# Patient Record
Sex: MALE | Race: WHITE | NOT HISPANIC OR LATINO
[De-identification: names, ages, dates, MRNs, and addresses within clinical notes are randomized per-mention and may not be internally consistent; named-entity substitution may affect disease eponyms.]

---

## 2022-07-18 ENCOUNTER — TRANSCRIPTION ENCOUNTER (OUTPATIENT)
Age: 87
End: 2022-07-18

## 2022-07-31 ENCOUNTER — TRANSCRIPTION ENCOUNTER (OUTPATIENT)
Age: 87
End: 2022-07-31

## 2023-06-16 ENCOUNTER — TRANSCRIPTION ENCOUNTER (OUTPATIENT)
Age: 88
End: 2023-06-16

## 2023-07-31 ENCOUNTER — APPOINTMENT (OUTPATIENT)
Dept: GERIATRICS | Facility: CLINIC | Age: 88
End: 2023-07-31
Payer: MEDICARE

## 2023-07-31 PROCEDURE — 99215 OFFICE O/P EST HI 40 MIN: CPT

## 2023-07-31 RX ORDER — GABAPENTIN 100 MG/1
100 CAPSULE ORAL
Qty: 90 | Refills: 3 | Status: ACTIVE | COMMUNITY
Start: 2023-07-31 | End: 1900-01-01

## 2023-09-30 VITALS
RESPIRATION RATE: 14 BRPM | SYSTOLIC BLOOD PRESSURE: 140 MMHG | OXYGEN SATURATION: 100 % | TEMPERATURE: 98 F | HEART RATE: 76 BPM | DIASTOLIC BLOOD PRESSURE: 76 MMHG | WEIGHT: 155.31 LBS

## 2023-09-30 RX ORDER — ACETAMINOPHEN 500 MG
500 TABLET ORAL EVERY 8 HOURS
Qty: 90 | Refills: 0 | Status: ACTIVE | COMMUNITY

## 2023-11-02 ENCOUNTER — APPOINTMENT (OUTPATIENT)
Dept: GERIATRICS | Facility: CLINIC | Age: 88
End: 2023-11-02
Payer: MEDICARE

## 2023-11-02 VITALS
HEART RATE: 86 BPM | SYSTOLIC BLOOD PRESSURE: 173 MMHG | DIASTOLIC BLOOD PRESSURE: 77 MMHG | TEMPERATURE: 97.3 F | RESPIRATION RATE: 16 BRPM | OXYGEN SATURATION: 98 % | WEIGHT: 156 LBS

## 2023-11-02 VITALS — HEIGHT: 64 IN

## 2023-11-02 VITALS — DIASTOLIC BLOOD PRESSURE: 78 MMHG | SYSTOLIC BLOOD PRESSURE: 138 MMHG

## 2023-11-02 PROCEDURE — 99215 OFFICE O/P EST HI 40 MIN: CPT

## 2023-12-07 ENCOUNTER — RESULT REVIEW (OUTPATIENT)
Age: 88
End: 2023-12-07

## 2023-12-07 ENCOUNTER — APPOINTMENT (OUTPATIENT)
Dept: GERIATRICS | Facility: CLINIC | Age: 88
End: 2023-12-07
Payer: MEDICARE

## 2023-12-07 VITALS
RESPIRATION RATE: 16 BRPM | TEMPERATURE: 97.6 F | DIASTOLIC BLOOD PRESSURE: 79 MMHG | OXYGEN SATURATION: 100 % | SYSTOLIC BLOOD PRESSURE: 130 MMHG | HEART RATE: 70 BPM

## 2023-12-07 DIAGNOSIS — R82.90 UNSPECIFIED ABNORMAL FINDINGS IN URINE: ICD-10-CM

## 2023-12-07 PROCEDURE — 99215 OFFICE O/P EST HI 40 MIN: CPT

## 2023-12-07 RX ORDER — ATORVASTATIN CALCIUM 80 MG/1
80 TABLET, FILM COATED ORAL
Qty: 30 | Refills: 0 | Status: DISCONTINUED | COMMUNITY
End: 2023-12-07

## 2023-12-07 RX ORDER — AMANTADINE HYDROCHLORIDE 100 MG/1
100 CAPSULE ORAL
Refills: 0 | Status: DISCONTINUED | COMMUNITY
End: 2023-12-07

## 2023-12-07 RX ORDER — GLUCOSA SU 2KCL/CHONDROITIN SU 500-400 MG
500 CAPSULE ORAL
Refills: 0 | Status: DISCONTINUED | COMMUNITY
End: 2023-12-07

## 2023-12-07 RX ORDER — GABAPENTIN 300 MG/1
300 CAPSULE ORAL
Qty: 90 | Refills: 3 | Status: ACTIVE | COMMUNITY
Start: 2023-12-07 | End: 1900-01-01

## 2023-12-12 DIAGNOSIS — N39.0 URINARY TRACT INFECTION, SITE NOT SPECIFIED: ICD-10-CM

## 2023-12-12 RX ORDER — CIPROFLOXACIN HYDROCHLORIDE 500 MG/1
500 TABLET, FILM COATED ORAL DAILY
Qty: 7 | Refills: 0 | Status: ACTIVE | COMMUNITY
Start: 2023-12-12 | End: 1900-01-01

## 2024-02-13 ENCOUNTER — NON-APPOINTMENT (OUTPATIENT)
Age: 89
End: 2024-02-13

## 2024-02-15 ENCOUNTER — TRANSCRIPTION ENCOUNTER (OUTPATIENT)
Age: 89
End: 2024-02-15

## 2024-02-29 ENCOUNTER — APPOINTMENT (OUTPATIENT)
Dept: GERIATRICS | Facility: CLINIC | Age: 89
End: 2024-02-29
Payer: MEDICARE

## 2024-02-29 VITALS — SYSTOLIC BLOOD PRESSURE: 130 MMHG | DIASTOLIC BLOOD PRESSURE: 82 MMHG

## 2024-02-29 VITALS
HEART RATE: 80 BPM | OXYGEN SATURATION: 97 % | TEMPERATURE: 99.1 F | BODY MASS INDEX: 25.95 KG/M2 | WEIGHT: 152 LBS | HEIGHT: 64 IN

## 2024-02-29 DIAGNOSIS — Z00.00 ENCOUNTER FOR GENERAL ADULT MEDICAL EXAMINATION W/OUT ABNORMAL FINDINGS: ICD-10-CM

## 2024-02-29 DIAGNOSIS — B37.2 CANDIDIASIS OF SKIN AND NAIL: ICD-10-CM

## 2024-02-29 PROCEDURE — 99215 OFFICE O/P EST HI 40 MIN: CPT

## 2024-02-29 RX ORDER — GABAPENTIN 100 MG/1
100 CAPSULE ORAL AT BEDTIME
Qty: 60 | Refills: 0 | Status: ACTIVE | COMMUNITY
Start: 2024-02-29 | End: 1900-01-01

## 2024-02-29 RX ORDER — QUETIAPINE FUMARATE 25 MG/1
25 TABLET ORAL
Qty: 60 | Refills: 3 | Status: ACTIVE | COMMUNITY
Start: 2024-02-29 | End: 1900-01-01

## 2024-02-29 NOTE — DATA REVIEWED
[FreeTextEntry1] : L3-L4: Disc bulge, bilateral facet arthropathy, ligamentous hypertrophy contributing to mild to moderate canal stenosis and mild to moderate bilateral foraminal stenosis.   L4-L5: Disc bulge, bilateral facet arthropathy, ligamentous hypertrophy contributing to no significant canal stenosis and mild bilateral foraminal stenosis.   L5-S1: Disc bulge, bilateral facet arthropathy, ligamentous hypertrophy contributing to no significant canal stenosis and mild bilateral foraminal stenosis.    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  Nondisplaced fracture of the left L2 transverse process.

## 2024-03-23 PROBLEM — B37.2 CANDIDAL INTERTRIGO: Status: ACTIVE | Noted: 2023-12-07

## 2024-03-23 NOTE — HEALTH RISK ASSESSMENT
[One fall no injury in past year] : Patient reported one fall in the past year without injury [Intercurrent hospitalizations] : was admitted to the hospital  [I will adhere to the patient's wishes.] : I will adhere to the patient's wishes. [FreeTextEntry4] : JUANSI reivewed DNr/DNi and trial of IV abx or IV hyydration

## 2024-03-23 NOTE — CURRENT MEDS
[Side Effects] :  side effects [Yes] : Reviewed medication list for presence of high-risk medications. [Lack of understanding] : lack of understanding [Sedatives] : sedatives

## 2024-03-23 NOTE — ASSESSMENT
[Living arrangements] : living arrangements [Pain Management] : pain management [Medication Management] : medication management [FreeTextEntry1] : pt is 89 y/o M with moderate dementia, FAST 6 with recent discharge from hospital for acute near syncopal episode s/p fall with non displaced fracture of L2 transverse process recetn hospitalization for acute UTI, concerned for behavioral with sedation during day, discussed pt likely to be in pain   gabapentin increased to 200 mg QHS may need to titrate to 300 mg , do not take during day continue tylenol 1000 mg TID MDD: 3000 mg   here for f/u of neuropathic pain as per daughter well controlled during day worse at night decrease gabapentin 200 mg QHS --discussed can titrate TID as needed for pain as for daytime continue tylenol 1000 mG TID decrease seroquel to 25 mg BID, if too sedated will decrease as tolerated  aspiration and fall precautions discussed ACD, MOLST reviewed DNR pt daughter antionetta is HCP continue medications as prescribed extensive discussion held with pt daughter and EUGENE regardin routine, reorienting pt, nonverbal signs of pain, appropriate use of medications, fall precautions, and adverse effects of sedation  constipation prune juice daily senna tabs 1 tab QHS  f/u in 1 month  PC SW referral for caregiver support discussed dementia and with progression of disease and trajectory pt daughter also with pt wife also with dementia in the home she cares for. medication management.

## 2024-03-23 NOTE — CURRENT MEDS
[Side Effects] :  side effects [Lack of understanding] : lack of understanding [Yes] : Reviewed medication list for presence of high-risk medications. [Sedatives] : sedatives

## 2024-03-23 NOTE — PHYSICAL EXAM
[General Appearance - Alert] : alert [General Appearance - In No Acute Distress] : in no acute distress [General Appearance - Well Nourished] : well nourished [Sclera] : the sclera and conjunctiva were normal [Normal Oral Mucosa] : normal oral mucosa [No Oral Pallor] : no oral pallor [No Oral Cyanosis] : no oral cyanosis [Outer Ear] : the ears and nose were normal in appearance [Hearing Threshold Finger Rub Not [place]] : hearing was normal [Examination Of The Oral Cavity] : the lips and gums were normal [Oropharynx] : The oropharynx was normal [Nasal Cavity] : the nasal mucosa and septum were normal [Neck Appearance] : the appearance of the neck was normal [Exaggerated Use Of Accessory Muscles For Inspiration] : no accessory muscle use [Auscultation Breath Sounds / Voice Sounds] : lungs were clear to auscultation bilaterally [Heart Rate And Rhythm] : heart rate was normal and rhythm regular [Heart Sounds] : normal S1 and S2 [Full Pulse] : the pedal pulses are present [Edema] : there was no peripheral edema [Bowel Sounds] : normal bowel sounds [Abdomen Soft] : soft [Abdomen Tenderness] : non-tender [] : no hepato-splenomegaly [Normal Sclera/Conjunctiva] : normal sclera/conjunctiva [No Acute Distress] : no acute distress [Normal Outer Ear/Nose] : the outer ears and nose were normal in appearance [Normal Oropharynx] : the oropharynx was normal [Supple] : supple [No JVD] : no jugular venous distention [No Accessory Muscle Use] : no accessory muscle use [No Respiratory Distress] : no respiratory distress  [Normal Rate] : normal rate  [Normal S1, S2] : normal S1 and S2 [Regular Rhythm] : with a regular rhythm [Soft] : abdomen soft [No HSM] : no HSM [Normal] : no rash

## 2024-03-23 NOTE — PHYSICAL EXAM
[General Appearance - Alert] : alert [General Appearance - In No Acute Distress] : in no acute distress [General Appearance - Well Nourished] : well nourished [Sclera] : the sclera and conjunctiva were normal [Normal Oral Mucosa] : normal oral mucosa [No Oral Pallor] : no oral pallor [No Oral Cyanosis] : no oral cyanosis [Outer Ear] : the ears and nose were normal in appearance [Hearing Threshold Finger Rub Not [place]] : hearing was normal [Examination Of The Oral Cavity] : the lips and gums were normal [Nasal Cavity] : the nasal mucosa and septum were normal [Oropharynx] : The oropharynx was normal [Neck Appearance] : the appearance of the neck was normal [Exaggerated Use Of Accessory Muscles For Inspiration] : no accessory muscle use [Auscultation Breath Sounds / Voice Sounds] : lungs were clear to auscultation bilaterally [Heart Rate And Rhythm] : heart rate was normal and rhythm regular [Heart Sounds] : normal S1 and S2 [Full Pulse] : the pedal pulses are present [Edema] : there was no peripheral edema [Bowel Sounds] : normal bowel sounds [Abdomen Soft] : soft [Abdomen Tenderness] : non-tender [] : no hepato-splenomegaly [Normal Sclera/Conjunctiva] : normal sclera/conjunctiva [No Acute Distress] : no acute distress [Normal Outer Ear/Nose] : the outer ears and nose were normal in appearance [Normal Oropharynx] : the oropharynx was normal [No JVD] : no jugular venous distention [Supple] : supple [No Respiratory Distress] : no respiratory distress  [No Accessory Muscle Use] : no accessory muscle use [Normal Rate] : normal rate  [Normal S1, S2] : normal S1 and S2 [Regular Rhythm] : with a regular rhythm [Soft] : abdomen soft [No HSM] : no HSM [Normal] : no rash

## 2024-03-23 NOTE — COUNSELING
[Fall prevention counseling provided] : Fall prevention counseling provided [No throw rugs] : No throw rugs [Adequate lighting] : Adequate lighting [Use recommended devices] : Use recommended devices [Use proper foot wear] : Use proper foot wear

## 2024-03-23 NOTE — HISTORY OF PRESENT ILLNESS
[FreeTextEntry1] : pt is 89 y/o M with moderate dementia, FAST 6 with recent discharge from hospital for acute near syncopal episode s/p fall with non displaced fracture of L2 transverse process complicated by acute bronchitis with history of chronic pain.  pt was seen in hospital, referred by Dr. Monreal, pt ryan Benoit is main caregiver, has HHA assistance pt reports pain in back, unable to give further details due to cogntiive impairment, daughter reports pt still gets up at night however has been sleeping better since d/c from hospital and pain appears to be well controlled. + bm  appetite is adequate. No falls since d/c, overall stable, no concerns at this time pain in knees, lower back, + constipation  OFFICE visit 11/2/23 pt seen with his daughter  reports pt with increased pain and unable to leep with agitatation and anxiety at night as per pt daughter.  pt denies symptoms states  "im perfect" pt daughter tearful and overwhelmed, report up all night trying to urinate and is moaning.  has help during the day + constipation   OFFICE VISIT 12/7/23 pt here with daughter and care giver Freddie pt minimally verbal,  + constipation,  pt with rash in between his legs  pt had issues with sleep currently sleeping well. pt currently sleeping well, gets up to go to bathroom, pt with pain well controlled [Post-hospitalization from ___ Hospital] : Post-hospitalization from [unfilled] Hospital [Admitted on: ___] : The patient was admitted on [unfilled] [Discharged on ___] : discharged on [unfilled] [Discharge Summary] : discharge summary [Radiology Findings] : radiology findings [Pertinent Labs] : pertinent labs [Discharge Med List] : discharge medication list [Med Reconciliation] : medication reconciliation has been completed [FreeTextEntry2] : pt is a 89 y/o M with history of bladder cancer and advanced dementia with lumabr stenosis admitted to [facility]  for espsis due to UTI with acute metabolic encephalopathy treated with IV abx with improvment in medical condition and was d/c home with home services to daughter  home.   pt is here in wheelchair accompanied by his daughter and EUGENE reports pt is agitated at night, but has been given seroquel and gabapentin and feel pt is too sedated during the day and doesnot eat. report pt behoverial issues start at about 3 prm and continue through the evening, may cry out in bed.  [Patient Contacted By: ____] : and contacted by [unfilled]

## 2024-03-23 NOTE — HEALTH RISK ASSESSMENT
[One fall no injury in past year] : Patient reported one fall in the past year without injury [Intercurrent hospitalizations] : was admitted to the hospital  [I will adhere to the patient's wishes.] : I will adhere to the patient's wishes. [FreeTextEntry4] : JUANIS reivewed DNr/DNi and trial of IV abx or IV hyydration

## 2024-03-23 NOTE — HISTORY OF PRESENT ILLNESS
[FreeTextEntry1] : pt is 89 y/o M with moderate dementia, FAST 6 with recent discharge from hospital for acute near syncopal episode s/p fall with non displaced fracture of L2 transverse process complicated by acute bronchitis with history of chronic pain.  pt was seen in hospital, referred by Dr. Monreal, pt ryan Benoit is main caregiver, has HHA assistance pt reports pain in back, unable to give further details due to cogntiive impairment, daughter reports pt still gets up at night however has been sleeping better since d/c from hospital and pain appears to be well controlled. + bm  appetite is adequate. No falls since d/c, overall stable, no concerns at this time pain in knees, lower back, + constipation  OFFICE visit 11/2/23 pt seen with his daughter  reports pt with increased pain and unable to leep with agitatation and anxiety at night as per pt daughter.  pt denies symptoms states  "im perfect" pt daughter tearful and overwhelmed, report up all night trying to urinate and is moaning.  has help during the day + constipation   OFFICE VISIT 12/7/23 pt here with daughter and care giver Freddie pt minimally verbal,  + constipation,  pt with rash in between his legs  pt had issues with sleep currently sleeping well. pt currently sleeping well, gets up to go to bathroom, pt with pain well controlled [Post-hospitalization from ___ Hospital] : Post-hospitalization from [unfilled] Hospital [Discharged on ___] : discharged on [unfilled] [Admitted on: ___] : The patient was admitted on [unfilled] [Discharge Summary] : discharge summary [Radiology Findings] : radiology findings [Pertinent Labs] : pertinent labs [Med Reconciliation] : medication reconciliation has been completed [Discharge Med List] : discharge medication list [FreeTextEntry2] : pt is a 89 y/o M with history of bladder cancer and advanced dementia with lumabr stenosis admitted to [facility]  for espsis due to UTI with acute metabolic encephalopathy treated with IV abx with improvment in medical condition and was d/c home with home services to daughter  home.   pt is here in wheelchair accompanied by his daughter and EUGENE reports pt is agitated at night, but has been given seroquel and gabapentin and feel pt is too sedated during the day and doesnot eat. report pt behoverial issues start at about 3 prm and continue through the evening, may cry out in bed.  [Patient Contacted By: ____] : and contacted by [unfilled]

## 2024-03-28 ENCOUNTER — APPOINTMENT (OUTPATIENT)
Dept: GERIATRICS | Facility: CLINIC | Age: 89
End: 2024-03-28
Payer: MEDICARE

## 2024-03-28 VITALS
OXYGEN SATURATION: 99 % | HEART RATE: 69 BPM | DIASTOLIC BLOOD PRESSURE: 76 MMHG | SYSTOLIC BLOOD PRESSURE: 135 MMHG | TEMPERATURE: 97.6 F

## 2024-03-28 DIAGNOSIS — F03.911 UNSPECIFIED DEMENTIA, UNSPECIFIED SEVERITY, WITH AGITATION: ICD-10-CM

## 2024-03-28 PROCEDURE — 99215 OFFICE O/P EST HI 40 MIN: CPT

## 2024-03-28 RX ORDER — GABAPENTIN 100 MG/1
100 CAPSULE ORAL
Qty: 60 | Refills: 4 | Status: ACTIVE | COMMUNITY
Start: 2024-03-28 | End: 1900-01-01

## 2024-04-23 DIAGNOSIS — L03.90 CELLULITIS, UNSPECIFIED: ICD-10-CM

## 2024-04-23 RX ORDER — CICLOPIROX OLAMINE 7.7 MG/G
0.77 CREAM TOPICAL TWICE DAILY
Qty: 2 | Refills: 0 | Status: ACTIVE | COMMUNITY
Start: 2024-04-23 | End: 1900-01-01

## 2024-05-06 ENCOUNTER — NON-APPOINTMENT (OUTPATIENT)
Age: 89
End: 2024-05-06

## 2024-05-06 NOTE — REVIEW OF SYSTEMS
[Negative] : Constitutional [FreeTextEntry2] : unable to obtain due to pt medical condition, as noted by family and observation

## 2024-05-06 NOTE — ASSESSMENT
[Frailty-weight loss of >5%] : frailty-weight loss  [Fall precautions] : fall precautions [Social support] : social support [Living arrangements] : living arrangements [Pain Management] : pain management [Advanced Directives] : advanced directives [Medication Management] : medication management [DNR] : Code Status: DNR [DNI] : Intubation: DNI [Comfort] : Treatment Guidelines: Comfort [FreeTextEntry1] : pt is 89 y/o M with moderate dementia, FAST 6 with recent discharge from hospital for acute near syncopal episode s/p fall with non displaced fracture of L2 transverse process recetn hospitalization for acute UTI, concerned for behavioral with sedation during day, discussed pt likely to be in pain   gabapentin increased to 200 mg QHS may need to titrate to 300 mg ,  or TID dosing continue tylenol 1000 mg TID MDD: 3000 mg   dementia: seroquel t 25 mg BID, can increase to 50 mg if needed at night aspiration and fall precautions discussed ACD, MOLST reviewed DNR pt daughter dimas is HCP continue medications as prescribed extensive discussion held with pt daughter and private HHA mk and his wife duglas regarding routine, reorienting pt, nonverbal signs of pain, appropriate use of medications, fall precautions, and adverse effects of sedation  constipation--hard stool prune juice daily senna tabs 1 tab QHS colace 100 mg BID f/u in 1 month  PC SW referral for caregiver support discussed dementia and with progression of disease and trajectory pt daughter also with pt wife also with dementia in the home she cares for. medication management.

## 2024-05-06 NOTE — PHYSICAL EXAM
[General Appearance - Alert] : alert [General Appearance - In No Acute Distress] : in no acute distress [General Appearance - Well Nourished] : well nourished [Sclera] : the sclera and conjunctiva were normal [Normal Oral Mucosa] : normal oral mucosa [No Oral Pallor] : no oral pallor [No Oral Cyanosis] : no oral cyanosis [Hearing Threshold Finger Rub Not [place]] : hearing was normal [Outer Ear] : the ears and nose were normal in appearance [Examination Of The Oral Cavity] : the lips and gums were normal [Oropharynx] : The oropharynx was normal [Nasal Cavity] : the nasal mucosa and septum were normal [Neck Appearance] : the appearance of the neck was normal [Exaggerated Use Of Accessory Muscles For Inspiration] : no accessory muscle use [Auscultation Breath Sounds / Voice Sounds] : lungs were clear to auscultation bilaterally [Heart Rate And Rhythm] : heart rate was normal and rhythm regular [Heart Sounds] : normal S1 and S2 [Edema] : there was no peripheral edema [Full Pulse] : the pedal pulses are present [Bowel Sounds] : normal bowel sounds [Abdomen Soft] : soft [Abdomen Tenderness] : non-tender [Involuntary Movements] : no involuntary movements were seen [] : no rash [Nail Clubbing] : no clubbing  or cyanosis of the fingernails [FreeTextEntry1] : oriented to self

## 2024-05-06 NOTE — HISTORY OF PRESENT ILLNESS
[FreeTextEntry1] : pt is 89 y/o M with moderate dementia, FAST 6 with recent discharge from hospital for acute near syncopal episode s/p fall with non displaced fracture of L2 transverse process complicated by acute bronchitis with history of chronic pain.  pt was seen in hospital, referred by Dr. Monreal, pt ryan Benoit is main caregiver, has HHA assistance pt reports pain in back, unable to give further details due to cogntiive impairment, daughter reports pt still gets up at night however has been sleeping better since d/c from hospital and pain appears to be well controlled. + bm  appetite is adequate. No falls since d/c, overall stable, no concerns at this time pain in knees, lower back, + constipation  OFFICE visit 11/2/23 pt seen with his daughter  reports pt with increased pain and unable to leep with agitatation and anxiety at night as per pt daughter.  pt denies symptoms states  "im perfect" pt daughter tearful and overwhelmed, report up all night trying to urinate and is moaning.  has help during the day + constipation   OFFICE VISIT 12/7/23 pt here with daughter and care giver Freddie pt minimally verbal,  + constipation,  pt with rash in between his legs  pt had issues with sleep currently sleeping well. pt currently sleeping well, gets up to go to bathroom, pt with pain well controlled  03/28/24 pt seen for f/u , reports pt doing well overall,  has been sleeping well, eating well, no agitation, needs refills for medications

## 2024-05-08 ENCOUNTER — APPOINTMENT (OUTPATIENT)
Dept: GERIATRICS | Facility: CLINIC | Age: 89
End: 2024-05-08
Payer: MEDICARE

## 2024-05-08 VITALS
DIASTOLIC BLOOD PRESSURE: 76 MMHG | RESPIRATION RATE: 14 BRPM | HEART RATE: 77 BPM | OXYGEN SATURATION: 98 % | SYSTOLIC BLOOD PRESSURE: 128 MMHG | TEMPERATURE: 98 F

## 2024-05-08 DIAGNOSIS — B35.3 TINEA PEDIS: ICD-10-CM

## 2024-05-08 DIAGNOSIS — M79.2 NEURALGIA AND NEURITIS, UNSPECIFIED: ICD-10-CM

## 2024-05-08 DIAGNOSIS — F03.B0 UNSPECIFIED DEMENTIA, MODERATE, WITHOUT BEHAVIORAL DISTURBANCE, PSYCHOTIC DISTURBANCE, MOOD DISTURBANCE, AND ANXIETY: ICD-10-CM

## 2024-05-08 DIAGNOSIS — Z51.5 ENCOUNTER FOR PALLIATIVE CARE: ICD-10-CM

## 2024-05-08 DIAGNOSIS — K59.09 OTHER CONSTIPATION: ICD-10-CM

## 2024-05-08 PROCEDURE — 99349 HOME/RES VST EST MOD MDM 40: CPT

## 2024-05-08 RX ORDER — CEPHALEXIN 500 MG/1
500 TABLET ORAL 3 TIMES DAILY
Qty: 21 | Refills: 0 | Status: DISCONTINUED | COMMUNITY
Start: 2024-04-23 | End: 2024-05-08

## 2024-05-08 RX ORDER — POVIDONE-IODINE 0.01 ML/1
10 SWAB TOPICAL
Qty: 30 | Refills: 0 | Status: ACTIVE | COMMUNITY
Start: 2024-05-08 | End: 1900-01-01

## 2024-05-08 RX ORDER — NYSTATIN 100000 U/G
100000 OINTMENT TOPICAL 4 TIMES DAILY
Qty: 3 | Refills: 5 | Status: DISCONTINUED | COMMUNITY
Start: 2023-12-07 | End: 2024-05-08

## 2024-05-08 NOTE — HISTORY OF PRESENT ILLNESS
[FreeTextEntry1] : pt is 89 y/o M with moderate dementia, FAST 6 with recent discharge from hospital for acute near syncopal episode s/p fall with non displaced fracture of L2 transverse process complicated by acute bronchitis with history of chronic pain.  pt was seen in hospital, referred by Dr. Monreal, pt ryan Benoit is main caregiver, has HHA assistance pt reports pain in back, unable to give further details due to cogntiive impairment, daughter reports pt still gets up at night however has been sleeping better since d/c from hospital and pain appears to be well controlled. + bm  appetite is adequate. No falls since d/c, overall stable, no concerns at this time pain in knees, lower back, + constipation  OFFICE visit 11/2/23 pt seen with his daughter  reports pt with increased pain and unable to leep with agitatation and anxiety at night as per pt daughter.  pt denies symptoms states  "im perfect" pt daughter tearful and overwhelmed, report up all night trying to urinate and is moaning.  has help during the day + constipation   OFFICE VISIT 12/7/23 pt here with daughter and care giver Freddie pt minimally verbal,  + constipation,  pt with rash in between his legs  pt had issues with sleep currently sleeping well. pt currently sleeping well, gets up to go to bathroom, pt with pain well controlled  03/28/24 pt seen for f/u , reports pt doing well overall,  has been sleeping well, eating well, no agitation, needs refills for medications  Home Visit,  pt seen in the home with daughter, son in law and formal private aide pt with pain in right foot, does not bear weight on foot, pt noted to grimace

## 2024-05-08 NOTE — ASSESSMENT
[Frailty-weight loss of >5%] : frailty-weight loss  [Fall precautions] : fall precautions [Social support] : social support [Advanced Directives] : advanced directives [Pain Management] : pain management [Medication Management] : medication management [DNI] : Intubation: DNI [FreeTextEntry1] : pt is 89 y/o M with moderate dementia, FAST 6 with recent discharge from hospital for acute near syncopal episode s/p fall with non-displaced fracture of L2 transverse process recent hospitalization recurrent UTI, pt pt with acute foot pain, seen in the home   Pain--cellulitis complicated by tenia pedis of right foot between 4th and 5th digits s/p oral abx treatment  betadine to foot, if able to obtain aquacelAG can use  hold antifungal  referral to Podiatry  continue tylenol 1000 mg TID MDD: 3000 mg   agitation--pain management tylenol as above continue seroquel to 25 mg BID, if too sedated will decrease as tolerated   constipation--hard stool prune juice daily senna tabs 1 tab QHS colace 100 mg BID  aspiration and fall precautions discussed ACDJUANIS reviewed DNR pt daughter Antionetta is HCP continue medications as prescribed extensive discussion held with pt daughter and EUGENE regardin routine, reorienting pt, nonverbal signs of pain, appropriate use of medications, fall precautions, and adverse effects of sedation  f/u in 1 month  PC SW referral for caregiver support discussed dementia and with progression of disease and trajectory pt daughter also with pt wife also with dementia in the home she cares for. medication management.

## 2024-05-08 NOTE — REVIEW OF SYSTEMS
[Feeling Poorly] : feeling poorly [Feeling Tired] : feeling tired [As noted in HPI] : as noted in HPI [As Noted in HPI] : as noted in HPI [Skin Wound] : skin wound [Negative] : Musculoskeletal [FreeTextEntry2] : unable to obtain due to pt medical condition, as noted by family and observation

## 2024-05-08 NOTE — PHYSICAL EXAM
[General Appearance - Alert] : alert [General Appearance - In No Acute Distress] : in no acute distress [Normal Oral Mucosa] : normal oral mucosa [No Oral Pallor] : no oral pallor [Outer Ear] : the ears and nose were normal in appearance [Hearing Threshold Finger Rub Not [place]] : hearing was normal [Both Tympanic Membranes Were Examined] : both tympanic membranes were normal [] : no respiratory distress [Exaggerated Use Of Accessory Muscles For Inspiration] : no accessory muscle use [Auscultation Breath Sounds / Voice Sounds] : lungs were clear to auscultation bilaterally [Heart Rate And Rhythm] : heart rate was normal and rhythm regular [Heart Sounds] : normal S1 and S2 [Bowel Sounds] : normal bowel sounds [Abdomen Soft] : soft [Abdomen Tenderness] : non-tender [FreeTextEntry1] : oriented to self

## 2024-05-14 ENCOUNTER — APPOINTMENT (OUTPATIENT)
Dept: GERIATRICS | Facility: CLINIC | Age: 89
End: 2024-05-14
Payer: MEDICARE

## 2024-05-14 PROCEDURE — 99441: CPT | Mod: 93

## 2024-05-28 ENCOUNTER — APPOINTMENT (OUTPATIENT)
Dept: GERIATRICS | Facility: HOME HEALTH | Age: 89
End: 2024-05-28

## 2024-06-17 ENCOUNTER — NON-APPOINTMENT (OUTPATIENT)
Age: 89
End: 2024-06-17

## 2024-06-17 DIAGNOSIS — R03.0 ELEVATED BLOOD-PRESSURE READING, W/OUT DIAGNOSIS OF HYPERTENSION: ICD-10-CM

## 2024-06-18 ENCOUNTER — APPOINTMENT (OUTPATIENT)
Dept: GERIATRICS | Facility: CLINIC | Age: 89
End: 2024-06-18
Payer: MEDICARE

## 2024-06-18 ENCOUNTER — NON-APPOINTMENT (OUTPATIENT)
Age: 89
End: 2024-06-18

## 2024-06-18 ENCOUNTER — RX RENEWAL (OUTPATIENT)
Age: 89
End: 2024-06-18

## 2024-06-18 DIAGNOSIS — I63.9 CEREBRAL INFARCTION, UNSPECIFIED: ICD-10-CM

## 2024-06-18 PROCEDURE — 99443: CPT | Mod: 93

## 2024-06-18 RX ORDER — AMLODIPINE BESYLATE 5 MG/1
5 TABLET ORAL DAILY
Qty: 30 | Refills: 0 | Status: ACTIVE | COMMUNITY
Start: 2024-06-17 | End: 1900-01-01

## 2024-06-26 ENCOUNTER — RX RENEWAL (OUTPATIENT)
Age: 89
End: 2024-06-26

## 2024-06-26 RX ORDER — QUETIAPINE FUMARATE 50 MG/1
50 TABLET ORAL
Qty: 30 | Refills: 0 | Status: ACTIVE | COMMUNITY
Start: 1900-01-01 | End: 1900-01-01

## 2024-06-26 RX ORDER — GABAPENTIN 300 MG/1
300 CAPSULE ORAL
Qty: 30 | Refills: 0 | Status: ACTIVE | COMMUNITY
Start: 2023-11-02 | End: 1900-01-01

## 2024-07-15 ENCOUNTER — RX RENEWAL (OUTPATIENT)
Age: 89
End: 2024-07-15

## 2024-08-01 ENCOUNTER — RESULT REVIEW (OUTPATIENT)
Age: 89
End: 2024-08-01

## 2024-08-01 ENCOUNTER — NON-APPOINTMENT (OUTPATIENT)
Age: 89
End: 2024-08-01

## 2024-08-01 DIAGNOSIS — R50.9 FEVER, UNSPECIFIED: ICD-10-CM

## 2024-08-15 ENCOUNTER — RESULT REVIEW (OUTPATIENT)
Age: 89
End: 2024-08-15

## 2024-08-23 ENCOUNTER — APPOINTMENT (OUTPATIENT)
Dept: GERIATRICS | Facility: HOME HEALTH | Age: 89
End: 2024-08-23

## 2024-08-23 VITALS
SYSTOLIC BLOOD PRESSURE: 100 MMHG | HEART RATE: 88 BPM | OXYGEN SATURATION: 96 % | DIASTOLIC BLOOD PRESSURE: 60 MMHG | RESPIRATION RATE: 20 BRPM | TEMPERATURE: 99 F

## 2024-08-23 DIAGNOSIS — Z51.5 ENCOUNTER FOR PALLIATIVE CARE: ICD-10-CM

## 2024-08-23 DIAGNOSIS — I63.9 CEREBRAL INFARCTION, UNSPECIFIED: ICD-10-CM

## 2024-08-23 DIAGNOSIS — F03.B0 UNSPECIFIED DEMENTIA, MODERATE, WITHOUT BEHAVIORAL DISTURBANCE, PSYCHOTIC DISTURBANCE, MOOD DISTURBANCE, AND ANXIETY: ICD-10-CM

## 2024-08-23 RX ORDER — MORPHINE SULFATE 20 MG/5ML
20 SOLUTION ORAL EVERY 4 HOURS
Qty: 42 | Refills: 0 | Status: ACTIVE | COMMUNITY
Start: 2024-08-23 | End: 1900-01-01

## 2024-08-23 RX ORDER — LORAZEPAM 2 MG/ML
2 CONCENTRATE ORAL EVERY 4 HOURS
Qty: 30 | Refills: 0 | Status: ACTIVE | COMMUNITY
Start: 2024-08-23 | End: 1900-01-01

## 2024-08-23 NOTE — REASON FOR VISIT
[Home] : at home, [unfilled] , at the time of the visit. [Medical Office: ([facility])___] : at the medical office located in  [Patient] : the patient [Self] : self [Family Member] : family member

## 2024-08-23 NOTE — PHYSICAL EXAM
[Normal Oral Mucosa] : normal oral mucosa [No Oral Cyanosis] : no oral cyanosis [Neck Appearance] : the appearance of the neck was normal [Heart Rate And Rhythm] : heart rate was normal and rhythm regular [Edema] : there was no peripheral edema [FreeTextEntry1] : + cyanosis of toes and fingers

## 2024-08-23 NOTE — ASSESSMENT
[Fall precautions] : fall precautions [Social support] : social support [Living arrangements] : living arrangements [Advanced Directives] : advanced directives [Pain Management] : pain management [Medication Management] : medication management [Completed DNR] : completed DNR [DNR] : Code Status: DNR [Comfort] : Treatment Guidelines: Comfort [DNI] : Intubation: DNI [FreeTextEntry1] : pt is 89 y/o M with dementia, FAST 7 with recurrent hospitalization for UTI, with declining functional status and possible neurological event in the home, famiyl electing for hospice services in the home. pps 20%   discussed ACD, MOLST reviewed DNR/DNI  comfort measures only pt ermias cochran is [phone] extensive discussion held with pt daughter and EUGENE regarding pt wtih prognosis of hours to days, imminently dying, we discussed End of life symptoms, including requiring medications for comfort such as morphine and ativan we discussed aspiration precautions and skin/personal cares in bed given pt condition. questions and concerns addressed. emotional support provided    [de-identified] : pt daughter dimas is [facility] [phone]

## 2024-08-23 NOTE — HISTORY OF PRESENT ILLNESS
[FreeTextEntry1] : pt is 89 y/o M with moderate dementia, FAST 6 with recent discharge from hospital for acute near syncopal episode s/p fall with non displaced fracture of L2 transverse process complicated by acute bronchitis with history of chronic pain.  Telehealth visit pt seen community paramedic Viral Wetzel, formal caregiver Freddie and pt daughter/HCP prashanthmessi pt is unresponsive, with mottling, pt has not eaten in 2 days + bm today  family wants to focus on comfort  vitals stable

## 2024-09-25 ENCOUNTER — APPOINTMENT (OUTPATIENT)
Dept: GERIATRICS | Facility: CLINIC | Age: 89
End: 2024-09-25